# Patient Record
Sex: MALE | Race: WHITE | NOT HISPANIC OR LATINO | Employment: OTHER | URBAN - METROPOLITAN AREA
[De-identification: names, ages, dates, MRNs, and addresses within clinical notes are randomized per-mention and may not be internally consistent; named-entity substitution may affect disease eponyms.]

---

## 2023-06-13 LAB
ALANINE AMINOTRANSFERASE (SGPT) (U/L) IN SER/PLAS: 23 U/L (ref 10–52)
ASPARTATE AMINOTRANSFERASE (SGOT) (U/L) IN SER/PLAS: 24 U/L (ref 9–39)
CHOLESTEROL (MG/DL) IN SER/PLAS: 108 MG/DL (ref 0–199)
CHOLESTEROL IN HDL (MG/DL) IN SER/PLAS: 33.7 MG/DL
CHOLESTEROL/HDL RATIO: 3.2
LDL: 60 MG/DL (ref 0–99)
TRIGLYCERIDE (MG/DL) IN SER/PLAS: 71 MG/DL (ref 0–149)
VLDL: 14 MG/DL (ref 0–40)

## 2024-06-11 ENCOUNTER — OFFICE VISIT (OUTPATIENT)
Dept: CARDIOLOGY | Facility: HOSPITAL | Age: 74
End: 2024-06-11
Payer: MEDICARE

## 2024-06-11 VITALS
DIASTOLIC BLOOD PRESSURE: 80 MMHG | BODY MASS INDEX: 22.02 KG/M2 | HEART RATE: 59 BPM | WEIGHT: 171.6 LBS | HEIGHT: 74 IN | SYSTOLIC BLOOD PRESSURE: 125 MMHG

## 2024-06-11 DIAGNOSIS — E78.5 HYPERLIPIDEMIA, UNSPECIFIED HYPERLIPIDEMIA TYPE: Primary | ICD-10-CM

## 2024-06-11 LAB
ATRIAL RATE: 59 BPM
P AXIS: 58 DEGREES
P OFFSET: 169 MS
P ONSET: 104 MS
PR INTERVAL: 230 MS
Q ONSET: 219 MS
QRS COUNT: 9 BEATS
QRS DURATION: 88 MS
QT INTERVAL: 412 MS
QTC CALCULATION(BAZETT): 407 MS
QTC FREDERICIA: 409 MS
R AXIS: 44 DEGREES
T AXIS: 110 DEGREES
T OFFSET: 425 MS
VENTRICULAR RATE: 59 BPM

## 2024-06-11 PROCEDURE — 1157F ADVNC CARE PLAN IN RCRD: CPT | Performed by: INTERNAL MEDICINE

## 2024-06-11 PROCEDURE — 93005 ELECTROCARDIOGRAM TRACING: CPT | Performed by: INTERNAL MEDICINE

## 2024-06-11 PROCEDURE — 1160F RVW MEDS BY RX/DR IN RCRD: CPT | Performed by: INTERNAL MEDICINE

## 2024-06-11 PROCEDURE — 1036F TOBACCO NON-USER: CPT | Performed by: INTERNAL MEDICINE

## 2024-06-11 PROCEDURE — 99214 OFFICE O/P EST MOD 30 MIN: CPT | Performed by: INTERNAL MEDICINE

## 2024-06-11 PROCEDURE — 93010 ELECTROCARDIOGRAM REPORT: CPT | Performed by: INTERNAL MEDICINE

## 2024-06-11 PROCEDURE — 1159F MED LIST DOCD IN RCRD: CPT | Performed by: INTERNAL MEDICINE

## 2024-06-11 RX ORDER — NITROGLYCERIN 0.4 MG/1
TABLET SUBLINGUAL
COMMUNITY

## 2024-06-11 RX ORDER — AA/PROT/LYSINE/METHIO/VIT C/B6 50-12.5 MG
1 TABLET ORAL
COMMUNITY
Start: 2023-06-13

## 2024-06-11 RX ORDER — ACETAMINOPHEN 500 MG
1 TABLET ORAL 2 TIMES DAILY
COMMUNITY
Start: 2023-06-13

## 2024-06-11 RX ORDER — ASPIRIN 81 MG/1
1 TABLET ORAL DAILY
COMMUNITY

## 2024-06-11 RX ORDER — TURMERIC ROOT EXTRACT 500 MG
3 TABLET ORAL
COMMUNITY
Start: 2023-09-25

## 2024-06-11 RX ORDER — ZINC GLUCONATE 100 MG
1 TABLET ORAL
COMMUNITY
Start: 2023-06-13

## 2024-06-11 RX ORDER — ESCITALOPRAM OXALATE 10 MG/1
10 TABLET ORAL
COMMUNITY
Start: 2024-05-21

## 2024-06-11 RX ORDER — ATORVASTATIN CALCIUM 20 MG/1
10 TABLET, FILM COATED ORAL NIGHTLY
COMMUNITY

## 2024-06-11 NOTE — PROGRESS NOTES
Subjective:  Patient returns for a routine annual follow-up.  He is a pleasant gentleman who is status post very remote LAD stenting with a bare-metal stent.  Repeat catheterization about 5 years ago showed a patent stent although there was a 70% stenosis just distal to the stent.  His LV function was reasonably preserved, and there was no other compelling coronary disease.    He generally continues to do very well on medical therapy.  He is staying very active physically both doing some exercise as well as doing vigorous work on his property.  He denies any angina or dyspnea at this high activity level.  He has not had any hospitalizations and denies any other new health concerns.  Of note, he did cut his atorvastatin down to 10 mg daily at the suggestion of a friend.    Objective:  General: Alert, usual pleasant self.  HEENT: Unchanged.  Lungs: Clear without crackles or wheezing.  Cardiac: Distant heart tones without change.  Abdomen: Nontender with normal bowel sounds.  Extremities: No edema.  Skin: No rash.  Neuro: Grossly unchanged.    EKG: Sinus bradycardia with first AV block.  Nonspecific T wave abnormality.  No acute changes.    Lipid panel: Cholesterol-145, HDL-50, LDL-79, TG-69.    Impression/plan:  Patient is doing quite well at this time.  He remains free of any anginal type symptoms, so I did not think we needed to embark on any repeat ischemic workup.    His heart rate and blood pressure remain under excellent control on monotherapy with carvedilol, so we will continue this unchanged.    He will continue remain on aspirin to protect his stent.    His lipids are not quite at goal, so I did get him to agree to going back on a atorvastatin at 20 mg daily.  He will recheck his lipids in 4 months and we will review the results with me by phone to be sure that we have these at goal.  I will escalate his lipid-lowering therapy at that time if necessary.    I will see him back in 1 year but he knows to call  for any intercurrent concerns.  He did not need any prescriptions renewed.    Patient instructions:    Increase atorvastatin to 20 mg a day.    Continue other medications unchanged.    Obtain repeat fasting lipid panel in 4 months and call for results.    Return to clinic in 1 year.

## 2024-09-01 DIAGNOSIS — E78.5 HYPERLIPIDEMIA, UNSPECIFIED: ICD-10-CM

## 2024-09-03 RX ORDER — ATORVASTATIN CALCIUM 20 MG/1
20 TABLET, FILM COATED ORAL NIGHTLY
Qty: 90 TABLET | Refills: 3 | Status: SHIPPED | OUTPATIENT
Start: 2024-09-03

## 2025-07-11 ENCOUNTER — TELEPHONE (OUTPATIENT)
Dept: CARDIOLOGY | Facility: CLINIC | Age: 75
End: 2025-07-11
Payer: MEDICARE

## 2025-07-11 DIAGNOSIS — E78.5 HYPERLIPIDEMIA, UNSPECIFIED: ICD-10-CM

## 2025-07-11 DIAGNOSIS — I25.10 ATHEROSCLEROTIC HEART DISEASE OF NATIVE CORONARY ARTERY WITHOUT ANGINA PECTORIS: ICD-10-CM

## 2025-07-11 RX ORDER — CARVEDILOL 3.12 MG/1
3.12 TABLET ORAL
Qty: 180 TABLET | Refills: 3 | Status: SHIPPED | OUTPATIENT
Start: 2025-07-11 | End: 2026-07-11

## 2025-07-11 RX ORDER — ATORVASTATIN CALCIUM 20 MG/1
20 TABLET, FILM COATED ORAL NIGHTLY
Qty: 90 TABLET | Refills: 3 | Status: SHIPPED | OUTPATIENT
Start: 2025-07-11

## 2025-08-06 ENCOUNTER — OFFICE VISIT (OUTPATIENT)
Dept: CARDIOLOGY | Facility: HOSPITAL | Age: 75
End: 2025-08-06
Payer: MEDICARE

## 2025-08-06 VITALS
HEIGHT: 73 IN | SYSTOLIC BLOOD PRESSURE: 139 MMHG | OXYGEN SATURATION: 98 % | HEART RATE: 60 BPM | WEIGHT: 176 LBS | DIASTOLIC BLOOD PRESSURE: 86 MMHG | BODY MASS INDEX: 23.33 KG/M2

## 2025-08-06 DIAGNOSIS — I25.10 ATHEROSCLEROSIS OF NATIVE CORONARY ARTERY OF NATIVE HEART WITHOUT ANGINA PECTORIS: ICD-10-CM

## 2025-08-06 DIAGNOSIS — E78.5 HYPERLIPIDEMIA, UNSPECIFIED HYPERLIPIDEMIA TYPE: Primary | ICD-10-CM

## 2025-08-06 PROCEDURE — 1157F ADVNC CARE PLAN IN RCRD: CPT | Performed by: NURSE PRACTITIONER

## 2025-08-06 PROCEDURE — 99213 OFFICE O/P EST LOW 20 MIN: CPT

## 2025-08-06 PROCEDURE — 3008F BODY MASS INDEX DOCD: CPT | Performed by: NURSE PRACTITIONER

## 2025-08-06 PROCEDURE — 1159F MED LIST DOCD IN RCRD: CPT | Performed by: NURSE PRACTITIONER

## 2025-08-06 PROCEDURE — 99214 OFFICE O/P EST MOD 30 MIN: CPT | Performed by: NURSE PRACTITIONER

## 2025-08-06 PROCEDURE — 1160F RVW MEDS BY RX/DR IN RCRD: CPT | Performed by: NURSE PRACTITIONER

## 2025-08-06 PROCEDURE — 93005 ELECTROCARDIOGRAM TRACING: CPT | Performed by: NURSE PRACTITIONER

## 2025-08-06 RX ORDER — BISMUTH SUBSALICYLATE 262 MG
1 TABLET,CHEWABLE ORAL DAILY
COMMUNITY

## 2025-08-06 RX ORDER — EZETIMIBE 10 MG/1
10 TABLET ORAL DAILY
Qty: 30 TABLET | Refills: 11 | Status: SHIPPED | OUTPATIENT
Start: 2025-08-06 | End: 2026-08-06

## 2025-08-06 RX ORDER — IBUPROFEN 100 MG/5ML
1000 SUSPENSION, ORAL (FINAL DOSE FORM) ORAL DAILY
COMMUNITY
Start: 2025-05-22

## 2025-08-06 RX ORDER — MAGNESIUM OXIDE 200 MG
1 TABLET,CHEWABLE ORAL DAILY
COMMUNITY

## 2025-08-06 ASSESSMENT — ENCOUNTER SYMPTOMS
CARDIOVASCULAR NEGATIVE: 1
DEPRESSION: 0
CONSTITUTIONAL NEGATIVE: 1
OCCASIONAL FEELINGS OF UNSTEADINESS: 0
RESPIRATORY NEGATIVE: 1
LOSS OF SENSATION IN FEET: 0

## 2025-08-06 NOTE — PATIENT INSTRUCTIONS
START EZETIMIBE 10 MG DAILY IN ADDITION TO ALL OTHER MEDICATION    RECHECK FASTING CHOLESTEROL TEST IN NOVEMBER    RETURN IN 1 YEAR    CALL FOR ANY CONCERNS

## 2025-08-06 NOTE — PROGRESS NOTES
"Subjective   Shawn Murrell is a 74 y.o. male.    Chief Complaint:  none    HPI  Mr. Murrell is seen in collaboration with Dr. Rodriguez for a annual visit.  He denies any recent hospitalization or ED visits.  He remains active and has started riding his bicycle again. He is active working on his property and is able to perform all activities of daily living without limitation. He denies any symptoms referable to angina.  He does describe a very brief \"second \"chest pinch on occasion.  This is dissimilar to prior angina.  He has no particular cardiac concerns.  He appears to be tolerating all medication including increased atorvastatin.  He does not need any medications refills.     Review of Systems   Constitutional: Negative.   Cardiovascular: Negative.    Respiratory: Negative.     All other systems reviewed and are negative.      Objective   Vitals reviewed.   Constitutional:       Appearance: Healthy appearance. Not in distress.   Neck:      Vascular: No JVR. JVD normal.   Pulmonary:      Effort: Pulmonary effort is normal.      Breath sounds: Normal breath sounds. No wheezing. No rhonchi. No rales.   Chest:      Chest wall: Not tender to palpatation.   Cardiovascular:      Normal rate. Regular rhythm. Normal S1. Normal S2.       Murmurs: There is no murmur.      No gallop.  No click. No rub.   Pulses:     Intact distal pulses.   Edema:     Peripheral edema absent.   Abdominal:      General: Bowel sounds are normal.      Palpations: Abdomen is soft.      Tenderness: There is no abdominal tenderness.   Musculoskeletal: Normal range of motion.         General: No tenderness. Skin:     General: Skin is warm and dry.   Neurological:      General: No focal deficit present.      Mental Status: Alert and oriented to person, place and time.       Lab Review:   Lab Results   Component Value Date     09/03/2021    K 4.4 09/03/2021     09/03/2021    CO2 27 09/03/2021    BUN 27 (H) 09/03/2021    CREATININE 1.22 " 09/03/2021    GLUCOSE 117 (H) 09/03/2021    CALCIUM 10.0 09/03/2021     Lab Results   Component Value Date    WBC 5.1 09/03/2021    HGB 14.4 09/03/2021    HCT 43.9 09/03/2021    MCV 97 09/03/2021     09/03/2021     Lab Results   Component Value Date    CHOL 108 06/13/2023    TRIG 71 06/13/2023    HDL 33.7 (A) 06/13/2023     ECG obtained today and reviewed by me shows sinus rhythm first-degree AV block and an age-indeterminate anterior infarct with a ventricular rate of 60 bpm.    Assessment/Plan   Mr. Murrell is a very pleasant 74 year old  male with a past medical history significant for coronary artery disease status post PCI to LAD in 2014 at the [facility]. Repeat heart catheterization 5/2019 revealed patent proximal to mid LAD stent with a 70% focal/eccentric mid LAD lesion just distal to the existing stent. LV function was reasonably preserved and there was no other compelling coronary disease. VS and ECG are stable.  He continues to be rather active without symptom referable to angina.  LDL cholesterol remains at 71 mg dL.  We discussed beginning Ezetimibe for further reduction and he will this to his outpatient medication regimen. He will repeat a FLP in November and return in another 1 year.  He will call with any concern or question in the interim.